# Patient Record
Sex: FEMALE | Race: WHITE
[De-identification: names, ages, dates, MRNs, and addresses within clinical notes are randomized per-mention and may not be internally consistent; named-entity substitution may affect disease eponyms.]

---

## 2017-04-19 ENCOUNTER — HOSPITAL ENCOUNTER (EMERGENCY)
Dept: HOSPITAL 12 - ER | Age: 27
Discharge: HOME | End: 2017-04-19
Payer: COMMERCIAL

## 2017-04-19 VITALS — WEIGHT: 143 LBS | HEIGHT: 58 IN | BODY MASS INDEX: 30.02 KG/M2

## 2017-04-19 VITALS — DIASTOLIC BLOOD PRESSURE: 85 MMHG | SYSTOLIC BLOOD PRESSURE: 121 MMHG

## 2017-04-19 DIAGNOSIS — F17.200: ICD-10-CM

## 2017-04-19 DIAGNOSIS — F32.9: ICD-10-CM

## 2017-04-19 DIAGNOSIS — Z88.6: ICD-10-CM

## 2017-04-19 DIAGNOSIS — B34.9: Primary | ICD-10-CM

## 2017-04-19 DIAGNOSIS — F10.20: ICD-10-CM

## 2017-04-19 DIAGNOSIS — Z88.8: ICD-10-CM

## 2017-04-19 PROCEDURE — A4663 DIALYSIS BLOOD PRESSURE CUFF: HCPCS

## 2019-07-13 ENCOUNTER — HOSPITAL ENCOUNTER (EMERGENCY)
Dept: HOSPITAL 12 - ER | Age: 29
Discharge: HOME | End: 2019-07-13
Payer: MEDICAID

## 2019-07-13 VITALS — HEIGHT: 58 IN | BODY MASS INDEX: 25.19 KG/M2 | WEIGHT: 120 LBS

## 2019-07-13 VITALS — DIASTOLIC BLOOD PRESSURE: 55 MMHG | SYSTOLIC BLOOD PRESSURE: 113 MMHG
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
## 2019-07-13 DIAGNOSIS — F17.200: ICD-10-CM

## 2019-07-13 DIAGNOSIS — Z88.8: ICD-10-CM

## 2019-07-13 DIAGNOSIS — Z79.2: ICD-10-CM

## 2019-07-13 DIAGNOSIS — F32.9: ICD-10-CM
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
## 2019-07-13 DIAGNOSIS — Z79.899: ICD-10-CM
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
## 2019-07-13 DIAGNOSIS — S61.215A: Primary | ICD-10-CM

## 2019-07-13 PROCEDURE — A4663 DIALYSIS BLOOD PRESSURE CUFF: HCPCS

## 2019-10-04 ENCOUNTER — HOSPITAL ENCOUNTER (EMERGENCY)
Dept: HOSPITAL 12 - ER | Age: 29
Discharge: HOME | End: 2019-10-04
Payer: MEDICAID

## 2019-10-04 VITALS — BODY MASS INDEX: 25.19 KG/M2 | WEIGHT: 120 LBS | HEIGHT: 58 IN
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
## 2019-10-04 DIAGNOSIS — Z79.899: ICD-10-CM

## 2019-10-04 DIAGNOSIS — Z90.49: ICD-10-CM

## 2019-10-04 DIAGNOSIS — Z88.8: ICD-10-CM
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
## 2019-10-04 DIAGNOSIS — F17.200: ICD-10-CM

## 2019-10-04 DIAGNOSIS — M77.9: Primary | ICD-10-CM

## 2019-10-04 DIAGNOSIS — Z79.2: ICD-10-CM
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
## 2019-10-04 PROCEDURE — A4663 DIALYSIS BLOOD PRESSURE CUFF: HCPCS

## 2019-12-26 ENCOUNTER — HOSPITAL ENCOUNTER (EMERGENCY)
Age: 29
Discharge: HOME | End: 2019-12-26

## 2019-12-26 DIAGNOSIS — R10.11: ICD-10-CM

## 2019-12-26 DIAGNOSIS — Z90.49: ICD-10-CM

## 2019-12-26 DIAGNOSIS — R11.2: ICD-10-CM

## 2019-12-26 DIAGNOSIS — Z88.8: ICD-10-CM

## 2019-12-26 DIAGNOSIS — F17.200: ICD-10-CM

## 2019-12-26 DIAGNOSIS — Z79.899: ICD-10-CM

## 2019-12-26 DIAGNOSIS — R10.13: Primary | ICD-10-CM

## 2020-08-30 ENCOUNTER — HOSPITAL ENCOUNTER (INPATIENT)
Dept: HOSPITAL 12 - ER | Age: 30
LOS: 2 days | Discharge: ANOTHER HOSPITAL | DRG: 465 | End: 2020-09-01
Payer: MEDICAID

## 2020-08-30 VITALS — SYSTOLIC BLOOD PRESSURE: 111 MMHG | DIASTOLIC BLOOD PRESSURE: 70 MMHG

## 2020-08-30 VITALS — HEIGHT: 58 IN | WEIGHT: 115 LBS | BODY MASS INDEX: 24.14 KG/M2

## 2020-08-30 DIAGNOSIS — F43.10: ICD-10-CM

## 2020-08-30 DIAGNOSIS — N39.0: ICD-10-CM

## 2020-08-30 DIAGNOSIS — F17.210: ICD-10-CM

## 2020-08-30 DIAGNOSIS — D72.829: ICD-10-CM

## 2020-08-30 DIAGNOSIS — K59.00: ICD-10-CM

## 2020-08-30 DIAGNOSIS — Z90.49: ICD-10-CM

## 2020-08-30 DIAGNOSIS — N20.0: Primary | ICD-10-CM

## 2020-08-30 DIAGNOSIS — R65.10: ICD-10-CM

## 2020-08-30 DIAGNOSIS — F12.90: ICD-10-CM

## 2020-08-30 LAB
ALP SERPL-CCNC: 56 U/L (ref 50–136)
ALT SERPL W/O P-5'-P-CCNC: 23 U/L (ref 14–59)
APPEARANCE UR: CLEAR
AST SERPL-CCNC: 15 U/L (ref 15–37)
BASOPHILS # BLD AUTO: 0.1 K/UL (ref 0–8)
BASOPHILS NFR BLD AUTO: 0.2 % (ref 0–2)
BILIRUB SERPL-MCNC: 0.8 MG/DL (ref 0.2–1)
BILIRUB UR QL STRIP: NEGATIVE
BUN SERPL-MCNC: 12 MG/DL (ref 7–18)
CHLORIDE SERPL-SCNC: 100 MMOL/L (ref 98–107)
CO2 SERPL-SCNC: 26 MMOL/L (ref 21–32)
COLOR UR: YELLOW
CREAT SERPL-MCNC: 0.8 MG/DL (ref 0.6–1.3)
DEPRECATED SQUAMOUS URNS QL MICRO: (no result) /HPF
EOSINOPHIL # BLD AUTO: 0.2 K/UL (ref 0–0.7)
EOSINOPHIL NFR BLD AUTO: 0.6 % (ref 0–7)
GLUCOSE SERPL-MCNC: 135 MG/DL (ref 74–106)
GLUCOSE UR STRIP-MCNC: NEGATIVE MG/DL
HCG UR QL: (no result)
HCT VFR BLD AUTO: 39.9 % (ref 31.2–41.9)
HGB BLD-MCNC: 13.6 G/DL (ref 10.9–14.3)
HGB UR QL STRIP: (no result)
KETONES UR STRIP-MCNC: (no result) MG/DL
LEUKOCYTE ESTERASE UR QL STRIP: NEGATIVE
LYMPHOCYTES # BLD AUTO: 0.9 K/UL (ref 20–40)
LYMPHOCYTES NFR BLD AUTO: 2.7 % (ref 20.5–51.5)
LYMPHOCYTES NFR BLD MANUAL: 7 % (ref 20–40)
MCH RBC QN AUTO: 31.8 UUG (ref 24.7–32.8)
MCHC RBC AUTO-ENTMCNC: 34 G/DL (ref 32.3–35.6)
MCV RBC AUTO: 93.5 FL (ref 75.5–95.3)
MONOCYTES # BLD AUTO: 1.4 K/UL (ref 2–10)
MONOCYTES NFR BLD AUTO: 4 % (ref 0–11)
MONOCYTES NFR BLD MANUAL: 2 % (ref 2–10)
NEUTROPHILS # BLD AUTO: 31.5 K/UL (ref 1.8–8.9)
NEUTROPHILS NFR BLD AUTO: 92.5 % (ref 38.5–71.5)
NEUTS BAND NFR BLD MANUAL: 7 % (ref 0–10)
NEUTS SEG NFR BLD MANUAL: 84 % (ref 42–75)
NITRITE UR QL STRIP: NEGATIVE
PH UR STRIP: 7 [PH] (ref 5–8)
PLATELET # BLD AUTO: 338 K/UL (ref 179–408)
POTASSIUM SERPL-SCNC: 3.6 MMOL/L (ref 3.5–5.1)
RBC # BLD AUTO: 4.27 MIL/UL (ref 3.63–4.92)
RBC #/AREA URNS HPF: (no result) /HPF (ref 0–3)
SP GR UR STRIP: 1.01 (ref 1–1.03)
UROBILINOGEN UR STRIP-MCNC: 0.2 E.U./DL
WBC # BLD AUTO: 34.1 K/UL (ref 3.8–11.8)
WBC #/AREA URNS HPF: (no result) /HPF
WBC #/AREA URNS HPF: (no result) /HPF (ref 0–3)
WS STN SPEC: 7.9 G/DL (ref 6.4–8.2)

## 2020-08-30 PROCEDURE — G0378 HOSPITAL OBSERVATION PER HR: HCPCS

## 2020-08-30 PROCEDURE — A4663 DIALYSIS BLOOD PRESSURE CUFF: HCPCS

## 2020-08-30 RX ADMIN — Medication PRN MG: at 22:30

## 2020-08-30 RX ADMIN — PIPERACILLIN SODIUM AND TAZOBACTAM SODIUM SCH MLS/HR: .375; 3 INJECTION, POWDER, LYOPHILIZED, FOR SOLUTION INTRAVENOUS at 23:39

## 2020-08-30 RX ADMIN — SODIUM CHLORIDE PRN MLS/HR: 0.9 INJECTION, SOLUTION INTRAVENOUS at 21:31

## 2020-08-30 NOTE — NUR
Spoke to Case Elaine Madden from [place], Pt's info provided per request. 
Patient is resting comfortably in bed with eyes closed. NAD noted.

## 2020-08-31 VITALS — SYSTOLIC BLOOD PRESSURE: 108 MMHG | DIASTOLIC BLOOD PRESSURE: 63 MMHG

## 2020-08-31 VITALS — SYSTOLIC BLOOD PRESSURE: 102 MMHG | DIASTOLIC BLOOD PRESSURE: 63 MMHG

## 2020-08-31 VITALS — SYSTOLIC BLOOD PRESSURE: 97 MMHG | DIASTOLIC BLOOD PRESSURE: 53 MMHG

## 2020-08-31 VITALS — DIASTOLIC BLOOD PRESSURE: 43 MMHG | SYSTOLIC BLOOD PRESSURE: 97 MMHG

## 2020-08-31 VITALS — SYSTOLIC BLOOD PRESSURE: 101 MMHG | DIASTOLIC BLOOD PRESSURE: 69 MMHG

## 2020-08-31 LAB
ALP SERPL-CCNC: 69 U/L (ref 50–136)
ALT SERPL W/O P-5'-P-CCNC: 52 U/L (ref 14–59)
AST SERPL-CCNC: 50 U/L (ref 15–37)
BASOPHILS # BLD AUTO: 0 K/UL (ref 0–8)
BASOPHILS NFR BLD AUTO: 0.1 % (ref 0–2)
BILIRUB SERPL-MCNC: 0.7 MG/DL (ref 0.2–1)
BUN SERPL-MCNC: 9 MG/DL (ref 7–18)
CHLORIDE SERPL-SCNC: 101 MMOL/L (ref 98–107)
CHOLEST SERPL-MCNC: 65 MG/DL (ref ?–200)
CO2 SERPL-SCNC: 26 MMOL/L (ref 21–32)
CREAT SERPL-MCNC: 0.7 MG/DL (ref 0.6–1.3)
EOSINOPHIL # BLD AUTO: 0 K/UL (ref 0–0.7)
EOSINOPHIL NFR BLD AUTO: 0.1 % (ref 0–7)
GLUCOSE SERPL-MCNC: 109 MG/DL (ref 74–106)
HCT VFR BLD AUTO: 31.6 % (ref 31.2–41.9)
HDLC SERPL-MCNC: 33 MG/DL (ref 40–60)
HGB BLD-MCNC: 10.8 G/DL (ref 10.9–14.3)
LYMPHOCYTES # BLD AUTO: 1 K/UL (ref 20–40)
LYMPHOCYTES NFR BLD AUTO: 5.3 % (ref 20.5–51.5)
MAGNESIUM SERPL-MCNC: 1.9 MG/DL (ref 1.8–2.4)
MCH RBC QN AUTO: 32.5 UUG (ref 24.7–32.8)
MCHC RBC AUTO-ENTMCNC: 34 G/DL (ref 32.3–35.6)
MCV RBC AUTO: 94.9 FL (ref 75.5–95.3)
MONOCYTES # BLD AUTO: 0.7 K/UL (ref 2–10)
MONOCYTES NFR BLD AUTO: 3.5 % (ref 0–11)
NEUTROPHILS # BLD AUTO: 17.7 K/UL (ref 1.8–8.9)
NEUTROPHILS NFR BLD AUTO: 91 % (ref 38.5–71.5)
PHOSPHATE SERPL-MCNC: 2.5 MG/DL (ref 2.5–4.9)
PLATELET # BLD AUTO: 226 K/UL (ref 179–408)
POTASSIUM SERPL-SCNC: 3.2 MMOL/L (ref 3.5–5.1)
RBC # BLD AUTO: 3.33 MIL/UL (ref 3.63–4.92)
TRIGL SERPL-MCNC: 81 MG/DL (ref 30–150)
TSH SERPL DL<=0.005 MIU/L-ACNC: 0.42 MIU/ML (ref 0.36–3.74)
WBC # BLD AUTO: 19.5 K/UL (ref 3.8–11.8)
WS STN SPEC: 6.2 G/DL (ref 6.4–8.2)

## 2020-08-31 RX ADMIN — DEXTROSE SCH MLS/HR: 50 INJECTION, SOLUTION INTRAVENOUS at 21:28

## 2020-08-31 RX ADMIN — MORPHINE SULFATE PRN MG: 4 INJECTION, SOLUTION INTRAMUSCULAR; INTRAVENOUS at 18:48

## 2020-08-31 RX ADMIN — MORPHINE SULFATE PRN MG: 4 INJECTION, SOLUTION INTRAMUSCULAR; INTRAVENOUS at 22:19

## 2020-08-31 RX ADMIN — PIPERACILLIN SODIUM AND TAZOBACTAM SODIUM SCH MLS/HR: .375; 3 INJECTION, POWDER, LYOPHILIZED, FOR SOLUTION INTRAVENOUS at 05:07

## 2020-08-31 RX ADMIN — DEXTROSE SCH MLS/HR: 50 INJECTION, SOLUTION INTRAVENOUS at 13:27

## 2020-08-31 RX ADMIN — PIPERACILLIN SODIUM AND TAZOBACTAM SODIUM SCH MLS/HR: .375; 3 INJECTION, POWDER, LYOPHILIZED, FOR SOLUTION INTRAVENOUS at 14:19

## 2020-08-31 RX ADMIN — DEXTROSE SCH MLS/HR: 50 INJECTION, SOLUTION INTRAVENOUS at 17:58

## 2020-08-31 RX ADMIN — MORPHINE SULFATE PRN MG: 4 INJECTION, SOLUTION INTRAMUSCULAR; INTRAVENOUS at 15:19

## 2020-08-31 RX ADMIN — Medication PRN MG: at 02:52

## 2020-08-31 RX ADMIN — MORPHINE SULFATE PRN MG: 4 INJECTION, SOLUTION INTRAMUSCULAR; INTRAVENOUS at 12:16

## 2020-08-31 RX ADMIN — Medication PRN MG: at 08:15

## 2020-08-31 RX ADMIN — DEXTROSE SCH MLS/HR: 50 INJECTION, SOLUTION INTRAVENOUS at 15:14

## 2020-08-31 RX ADMIN — SODIUM CHLORIDE PRN MLS/HR: 0.9 INJECTION, SOLUTION INTRAVENOUS at 14:20

## 2020-08-31 NOTE — NUR
Pt stated to remove her mother, Emilee, from facesheet and doesn't want any information given 
to her. Changed facesheet.

## 2020-08-31 NOTE — NUR
Received pt in bed asleep arousable to name and touch, AOx4, on RA with no SOB or distress 
noted at this time. IV on right FA 22g running NS @ 75 cc. Complained of severe right 
abdominal pain that radiates to left side of abdomen. Pt stated still awaiting for surgeon 
to come see her for plan of care, hospitalist aware. Reinforced of NPO orders. Bed locked in 
lowest position with siderails 2x up. Call light and phone within reach.

## 2020-08-31 NOTE — NUR
Admitted patient from ER with admitting diagnosis of Abdominal pain R/O Appendicitis. AAO 
x4. No acute distress or SOB was noted. Condition fair. On room air. Able to make needs 
known. Complained of abdominal pain, rated 7/10 on numeric scale, PRN IV Morphine sulfate 
administered and effective . All admission work done. Physical assessment done. MRSA swab 
sent to the lab. IV antibiotic administered. Patient is NPO for possible appendectomy. 
Safety measures maintained. Fall prevention maintained. All needs attended promptly. Bed in 
locked and low position, Side rails up x2 for safety. Continue to monitor and will endorse 
to oncoming nurse accordingly.

## 2020-08-31 NOTE — NUR
Patient alert oriented, no sob no chest pain. Patient continue on pain management due r 
abdomen pain, complain of nausea will medicate as ordered.

## 2020-08-31 NOTE — NUR
Pt complained right arm is burning and radiates to right side. IVPB KCL running and stopped 
at this time. Informed MD and ordered for Potassium chloride with Lidocaine. Informed 
pharmacy. Pt also stated that she can't take big inhales due to pain on the right side, 
placed on O2 @ 0.5L. Pt states it's making her feel better.

## 2020-09-01 VITALS — DIASTOLIC BLOOD PRESSURE: 62 MMHG | SYSTOLIC BLOOD PRESSURE: 97 MMHG

## 2020-09-01 VITALS — SYSTOLIC BLOOD PRESSURE: 122 MMHG | DIASTOLIC BLOOD PRESSURE: 71 MMHG

## 2020-09-01 VITALS — SYSTOLIC BLOOD PRESSURE: 103 MMHG | DIASTOLIC BLOOD PRESSURE: 61 MMHG

## 2020-09-01 VITALS — SYSTOLIC BLOOD PRESSURE: 97 MMHG | DIASTOLIC BLOOD PRESSURE: 56 MMHG

## 2020-09-01 LAB
BASOPHILS # BLD AUTO: 0 K/UL (ref 0–8)
BASOPHILS NFR BLD AUTO: 0.3 % (ref 0–2)
BUN SERPL-MCNC: 7 MG/DL (ref 7–18)
CHLORIDE SERPL-SCNC: 104 MMOL/L (ref 98–107)
CO2 SERPL-SCNC: 26 MMOL/L (ref 21–32)
CREAT SERPL-MCNC: 0.7 MG/DL (ref 0.6–1.3)
EOSINOPHIL # BLD AUTO: 0.1 K/UL (ref 0–0.7)
EOSINOPHIL NFR BLD AUTO: 1.1 % (ref 0–7)
GLUCOSE SERPL-MCNC: 86 MG/DL (ref 74–106)
HCT VFR BLD AUTO: 27.8 % (ref 31.2–41.9)
HGB BLD-MCNC: 9.7 G/DL (ref 10.9–14.3)
LYMPHOCYTES # BLD AUTO: 1.6 K/UL (ref 20–40)
LYMPHOCYTES NFR BLD AUTO: 20.7 % (ref 20.5–51.5)
MAGNESIUM SERPL-MCNC: 1.8 MG/DL (ref 1.8–2.4)
MCH RBC QN AUTO: 32.8 UUG (ref 24.7–32.8)
MCHC RBC AUTO-ENTMCNC: 35 G/DL (ref 32.3–35.6)
MCV RBC AUTO: 94.2 FL (ref 75.5–95.3)
MONOCYTES # BLD AUTO: 0.6 K/UL (ref 2–10)
MONOCYTES NFR BLD AUTO: 7.5 % (ref 0–11)
NEUTROPHILS # BLD AUTO: 5.4 K/UL (ref 1.8–8.9)
NEUTROPHILS NFR BLD AUTO: 70.4 % (ref 38.5–71.5)
PHOSPHATE SERPL-MCNC: 1.9 MG/DL (ref 2.5–4.9)
PLATELET # BLD AUTO: 209 K/UL (ref 179–408)
POTASSIUM SERPL-SCNC: 3.4 MMOL/L (ref 3.5–5.1)
RBC # BLD AUTO: 2.96 MIL/UL (ref 3.63–4.92)
WBC # BLD AUTO: 7.7 K/UL (ref 3.8–11.8)

## 2020-09-01 RX ADMIN — MORPHINE SULFATE PRN MG: 4 INJECTION, SOLUTION INTRAMUSCULAR; INTRAVENOUS at 17:06

## 2020-09-01 RX ADMIN — PIPERACILLIN SODIUM AND TAZOBACTAM SODIUM SCH MLS/HR: .375; 3 INJECTION, POWDER, LYOPHILIZED, FOR SOLUTION INTRAVENOUS at 00:32

## 2020-09-01 RX ADMIN — PIPERACILLIN SODIUM AND TAZOBACTAM SODIUM SCH MLS/HR: .375; 3 INJECTION, POWDER, LYOPHILIZED, FOR SOLUTION INTRAVENOUS at 06:58

## 2020-09-01 RX ADMIN — PIPERACILLIN SODIUM AND TAZOBACTAM SODIUM SCH MLS/HR: .375; 3 INJECTION, POWDER, LYOPHILIZED, FOR SOLUTION INTRAVENOUS at 14:29

## 2020-09-01 RX ADMIN — SODIUM CHLORIDE PRN MLS/HR: 0.9 INJECTION, SOLUTION INTRAVENOUS at 13:31

## 2020-09-01 RX ADMIN — MORPHINE SULFATE PRN MG: 4 INJECTION, SOLUTION INTRAMUSCULAR; INTRAVENOUS at 08:29

## 2020-09-01 RX ADMIN — MORPHINE SULFATE PRN MG: 4 INJECTION, SOLUTION INTRAMUSCULAR; INTRAVENOUS at 20:26

## 2020-09-01 RX ADMIN — MORPHINE SULFATE PRN MG: 4 INJECTION, SOLUTION INTRAMUSCULAR; INTRAVENOUS at 13:18

## 2020-09-01 NOTE — NUR
Patient slept most of the night, no sob no chest pain, cont pain management due abdominal 
pain. Patient has no further nausea nor vomiting at this time. Patient was instructed to 
stop drinking fluids since last night due vomiting, patient on IV hydration.  Patient IV 
site was changed to left arm/wrist, patient is a hard stick, cont to monitor.

## 2020-09-01 NOTE — NUR
[facility] professional from [employer] safely transferred patient onto 
[facility] for acute to acute care hospital transfer to [facility]. v/s stable and no s/s 
of acute distress at discharge. patient on 98% RA. PIV intact and patent. ID band off. 
Belongings paperwork completed and in patient chart. patient signature obtained on 
authorization for release of transfer to [facility]. report given to FINN Schrader at 
[facility] going to room #228 under care of Dr. Viera. [place] call for release of 
transfer call received by [place], contact information [phone]. patient safely discharged 
with belongings and transfer paperwork provided to alejandro [facility] professional. Juliet Joiner NP notified of transfer. 

-------------------------------------------------------------------------------

Addendum: 09/02/20 at 0606 by RONAN CORONA RN

-------------------------------------------------------------------------------

correction on date and time: 09/02/20; time: 0606

## 2020-09-01 NOTE — NUR
PT C/O OF SEVERE RIGHIT LATERAL SIDE PAIN OF THE ABDOMEN LEVEL 10/10. MEDICATED WITH 
MORPHINE 4MG SLOW IVP WITH EFFECTIVE RESULT. PT IS EATING MODERATELY. NO N/V.

## 2020-09-01 NOTE — NUR
Notify Zaynab Benson NP patient request for sleeping medication, with order of Ambien 5mg po 
hs prn.

## 2020-09-01 NOTE — NUR
AWAITING FOR [employer] TRANSFER PLACEMENT OF THE PT. DISCHARGE ORDER MADE. NO APPARENT 
DISTRESS NOTED. PT MADE AWARE OF THE TRANSFER PLAN AND SHE'S AMENABLE.

## 2023-04-24 ENCOUNTER — HOSPITAL ENCOUNTER (EMERGENCY)
Dept: HOSPITAL 12 - ER | Age: 33
Discharge: HOME | End: 2023-04-24
Payer: MEDICAID

## 2023-04-24 VITALS — HEIGHT: 65 IN | WEIGHT: 138 LBS | BODY MASS INDEX: 22.99 KG/M2
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
## 2023-04-24 DIAGNOSIS — Z90.49: ICD-10-CM

## 2023-04-24 DIAGNOSIS — Z88.8: ICD-10-CM

## 2023-04-24 DIAGNOSIS — S09.90XA: Primary | ICD-10-CM

## 2023-04-24 DIAGNOSIS — Z79.899: ICD-10-CM

## 2023-04-24 DIAGNOSIS — F17.210: ICD-10-CM
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
## 2023-04-24 LAB — HCG UR QL: NEGATIVE

## 2023-04-24 PROCEDURE — A4663 DIALYSIS BLOOD PRESSURE CUFF: HCPCS

## 2023-04-24 NOTE — NUR
Pt educated on prescriptions medications was unhappy stating there is shortage 
of prescribed med. pt. left room AAOx4.

## 2023-04-24 NOTE — NUR
DCD instructions and prescription given to pt. who verbalized understanding. 
 had discussed recommendations with pt. before dcd. pt. left AAOX4. at this 
time ptl, stating that tramadol makes her jump out of her skin.